# Patient Record
(demographics unavailable — no encounter records)

---

## 2025-02-03 NOTE — HISTORY OF PRESENT ILLNESS
[de-identified] : Patient is a 69F who presents to discuss the role of an allogeneic HSCT in the management of their myelofibrosis. I had the pleasure of seeing the patient in consultation, who was referred by Dr. Jiménez at [facility] [place], with whom she follows for myelofibrosis. She also follows with Dr. Cleveland at [facility] [place] for mgmt of her diagnosis of marginal zone lymphoma. Patient is accompanied by her son Blane.  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 She was followed for dx of ET since  (see supportive care below), until in  when BMBx performed consistent with myelofibrosis (fibrosis grade 2-3) and a lymphoma. At that time she started procrit injections which continued q2-3 weeks until present. She had progressive decrease in hgb warranting a repeat BMBx in 2024 and based on these findings initiated 10/2024 for treatment of MZL - weekly x4 doses then planned for monthly for total 6 doses. Also performed imaging at that time, with 2024 CT A/P revealing 19.1cm splenomegaly, similar to seen on prior PET/CT in . Discussed initiation of momelitinib at that time, held until completion of rituximab. BMBx was repeated again 2024, imaging has not yet been repeated. See tx/staging hx below, incomplete records are available. Pathology and labwork notable for: CALR, del 20q, no peripheral blasts detected.  More recently developed new transfusion requirement, unclear whether related to lymphoma vs MF- she was hospitalized at [facility] early 2025 and found to have hgb 4.1, transfused 2u PRBC and sent home. Again on 25 required a PRBC transfusion outpatient for hgb 6.4, no prior transfusion requirements. At this time planned to start momelitinib 25.  She was seen by lymphoma BMT specialists at [facility] [place] (gabriel), and presents to [facility] for BMT consult with Dr. oSn.  --------------------Treatment Hx: ------------------------- for ET-MF:dx  inegrilide until  hydrea   MF: dx  Procrit q2-3 weeks 8/15/22 - present momelotinib, to start 25  for lymphoma: rituxan weekly x4, then monthly First dose: 10/2024 - ongoing  --------------------Imaging Hx:------------------------------ 24 CT A/P: since 22 probably unchanged hepatosplenomegaly, unchanged mottled appearance and patchy sclerosis throughout the visualized osseos structures probably r/t MF. LLL 0.6cm nodule. unclear if present in prior exam. F/u suggested.    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  24 bmbX - persistent MPN with no increase in blasts (MF 2-3) - persistent low grade B cell lymphoma, 40-50% blasts by IHC ---Morphology: ---Flow: 3% abnormal B cell poulation ---FISH: ---Karyotype: ---NGS: TET2, CALR  24 BMBx - low grade b-cell lymphoma with plasmacytic differentiation, 5-10% by CD20 IHC - myeloproliferative neoplasm, fibrotic stage (MF 2-3), <5% blast by CD34 IHC - Hypercellular, 80-90% - FISH: del 20q, normal female karyotype   --------------------Social Hx: --------------------------- The patient is marital status and has 2 children (son age 41, daughter age 39 who lives in [facility]). Work:  --------------------Surgical Hx: ------------------------  section  --------------------Past Family Hx:----------------------    ------------------Medications----------------------------- acyclovir 400mg BID vitamin b12 500mcg daily ASA 81mg daily topamax 50mg Q AM, 100mg Q PM ocuvite oral daily ativan 0.5mg PRN probiotic capsule daily biotin 5000mcg gabapentin 300mg nightly spironolactone daily Tylenol/Ibuprofen PRN  [de-identified] : Presents for consult visit, for consideration of allo SCT in treatment of her myelofibrosis. She reports feeling generallly well, some increased fatigue since recently requiring PRBC transfusions, now s/p 4 total in past ~month. Otherwise remains active and independent, she is working as an . Tolerated rituxan for tx of MZL without issue, planned for last dose next week (q 4 wk), and will start momelitonib tomorrow when delivered. Otherwise reports pruritis nightly keeping her awake, since novermber. Denies night sweats at this time (over summer 2024 had 3x/wk), fever/chills, unexpected wt or appetite change.  LLE swelling since injury in car door very slow improvement, no orthopedic or vascular imaging per reports. Pain controlled with advil/ibuprofen PRN and more sparingly gabapentin 300mg nightly, and does not limit ability to perform ADLs.

## 2025-02-03 NOTE — PHYSICAL EXAM
[Thin] : thin [Normal] : affect appropriate [Restricted in physically strenuous activity but ambulatory and able to carry out work of a light or sedentary nature] : Status 1- Restricted in physically strenuous activity but ambulatory and able to carry out work of a light or sedentary nature, e.g., light house work, office work [de-identified] : palpable spleen 3-4 fingers below

## 2025-02-03 NOTE — HISTORY OF PRESENT ILLNESS
[de-identified] : Patient is a 69F who presents to discuss the role of an allogeneic HSCT in the management of their myelofibrosis. I had the pleasure of seeing the patient in consultation, who [place] [facility] [place] for [place]ofib[place]. She [place] [facility] [place] diagnosis of marginal zone lymphoma. Patient is accompanied by her son Blane.  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 She was followed for dx of ET since  (see supportive care below), until in  when BMBx performed consistent with myelofibrosis (fibrosis grade 2-3) and a lymphoma. At that time she started procrit injections which continued q2-3 weeks until present. She had progressive decrease in hgb warranting a repeat BMBx in 2024 and based on these findings initiated 10/2024 for treatment of MZL - weekly x4 doses then planned for monthly for total 6 doses. Also performed imaging at that time, with 2024 CT A/P revealing 19.1cm splenomegaly, similar to seen on prior PET/CT in . Discussed initiation of momelitinib at that time, held until completion of rituximab. BMBx was repeated again 2024, imaging has not yet been repeated. See tx/staging hx below, incomplete records are available. Pathology and labwork notable for: CALR, del 20q, no peripheral blasts detected.  More recently developed new transfusion requirement, unclear whether related to lymphoma vs MF- she was hospitalized at [facility] early 2025 and found to have hgb 4.1, transfused 2u PRBC and sent home. Again on 25 required a PRBC transfusion outpatient for hgb 6.4, no prior transfusion requirements. At this time planned to start momelitinib 25.  [place] B[place] [facility] [place] and presents to [facility] for BMT consult with Dr. Son.  --------------------Treatment Hx: ------------------------- for ET-MF:dx  inegrilide until  hydrea   MF: dx  Procrit q2-3 weeks 8/15/22 - present momelotinib, to start 25  for lymphoma: rituxan weekly x4, then monthly First dose: 10/2024 - ongoing  --------------------Imaging Hx:------------------------------ 24 CT A/P: since 22 probably unchanged hepatosplenomegaly, unchanged mottled appearance and patchy sclerosis throughout the visualized osseos structures probably r/t MF. LLL 0.6cm nodule. unclear if present in prior exam. F/u suggested.    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  24 bmbX - persistent MPN with no increase in blasts (MF 2-3) - persistent low grade B cell lymphoma, 40-50% blasts by IHC ---Morphology: ---Flow: 3% abnormal B cell poulation ---FISH: ---Karyotype: ---NGS: TET2, CALR  24 BMBx - low grade b-cell lymphoma with plasmacytic differentiation, 5-10% by CD20 IHC - myeloproliferative neoplasm, fibrotic stage (MF 2-3), <5% blast by CD34 IHC - Hypercellular, 80-90% - FISH: del 20q, normal female karyotype   --------------------Social Hx: --------------------------- The patient is marital status and has 2 children (son age 41, daughter age 39 who lives in [facility]). Work:  --------------------Surgical Hx: ------------------------  section  --------------------Past Family Hx:----------------------    ------------------Medications----------------------------- acyclovir 400mg BID vitamin b12 500mcg daily ASA 81mg daily topamax 50mg Q AM, 100mg Q PM ocuvite oral daily ativan 0.5mg PRN probiotic capsule daily biotin 5000mcg gabapentin 300mg nightly spironolactone daily Tylenol/Ibuprofen PRN  [de-identified] : Presents for consult visit, for consideration of allo SCT in treatment of her myelofibrosis. She reports feeling generallly well, some increased fatigue since recently requiring PRBC transfusions, now s/p 4 total in past ~month. Otherwise remains active and independent, she is working as an . Tolerated rituxan for tx of MZL without issue, planned for last dose next week (q 4 wk), and will start momelitonib tomorrow when delivered. Otherwise reports pruritis nightly keeping her awake, since novermber. Denies night sweats at this time (over summer 2024 had 3x/wk), fever/chills, unexpected wt or appetite change.  LLE swelling since injury in car door very slow improvement, no orthopedic or vascular imaging per reports. Pain controlled with advil/ibuprofen PRN and more sparingly gabapentin 300mg nightly, and does not limit ability to perform ADLs.

## 2025-02-03 NOTE — PHYSICAL EXAM
[Thin] : thin [Normal] : affect appropriate [Restricted in physically strenuous activity but ambulatory and able to carry out work of a light or sedentary nature] : Status 1- Restricted in physically strenuous activity but ambulatory and able to carry out work of a light or sedentary nature, e.g., light house work, office work [de-identified] : palpable spleen 3-4 fingers below

## 2025-02-03 NOTE — ASSESSMENT
Per Kesha Martinez NP: Decrease pletal to 50 mg bid - please call back in 1 week with update             Left message for patient to call back [FreeTextEntry1] : Patient is a 69F diagnosed with myelofibrosis, with preceding ET....disease may be accelerating given recent increase in transfusion needs Patient's history, pathology, imaging, and plan was reviewed. She is notable for additional diagnosis of MZL, currently undergoing treatment with rituximab.  Disease: myelofibrosis; MIPPSS intermediate risk Prognostic indicators: CALR, del 20q, no peripheral blasts, +splenomegaly Treatment: not yet started  Patient has not yet started treatment of myelofibrosis, planned for control with momelotinib. At this point, unable to assess disease, as she does have new PRBC transfusion requirement however unclear whether this is r/t progression of myelofibrosis vs r/t co-morbidity of low grade lymphoma which is currently being treated with rituximab (to complete dose 6/6 next week). Pending restaging, can determine BMT Indications for Transplant indication for an allogeneic stem cell transplant based on the ASTCT practice guidelines.  The patient is a good candidate for transplant and has good social support. We spoke to them at length regarding their disease, treatment options, and the role of allogeneic HSCT in the treatment of their myelofibrosis and the expected morbidity and mortality as well as impact on disease-free, overall survival.  We discussed pre-transplant work up, stem cell donor identification, donor collection of stem cells, conditioning chemotherapy, GvHD prophylaxis, allogeneic stem cell transplant hospital admission, expected hospital course, side effects of treatment, discharge medication, risk of relapse and death post-transplant, post-transplant outpatient follow ups and precautions. We also discussed management/further evaluation of lymphoma.   The patient and her son asked a number of questions, their questions have been answered. At this time, would like to proceed with transplant but will take some time to consider and discuss with family the appropriate timeline for this and/or preferred facility with which to pursue transplant as she has been treated else where. She will also follow up with primary hematologist prior to next steps.    We encouraged the patient to take the time to read our transplant book, provided today. They will be referred to our  for a psycho/social evaluation prior to transplant should she proceed. HLA typing for the patient was not performed during the initial consult.   We recommend a repeat bone marrow biopsy to evaluate their disease status (of both MF and MZL), as well as repeat imaging to evaluate splenomegaly, possibly after several weeks on momelotinib. The patient verbalized understanding of the plan. We will reach out to their primary hematologist with our recommendations. The patient was encouraged to contact us with any questions or concerns that may arise in the interim.  Follow-up: pending restaging   Pt seen and examined with Zelda Coles NP [facility] Transplantation [facility] Cellular Therapy [facility]

## 2025-02-03 NOTE — HISTORY OF PRESENT ILLNESS
[de-identified] : Patient is a 69F who presents to discuss the role of an allogeneic HSCT in the management of their myelofibrosis. I had the pleasure of seeing the patient in consultation, who was [place] [facility] [place] for [place]ofib[place]. She also [place] with [place]. Cleveland at [facility] [place]mt of her diagnosis of marginal zone lymphoma. Patient is accompanied by her son Blane.  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 She was followed for dx of ET since  (see supportive care below), until in  when BMBx performed consistent with myelofibrosis (fibrosis grade 2-3) and a lymphoma. At that time she started procrit injections which continued q2-3 weeks until present. She had progressive decrease in hgb warranting a repeat BMBx in 2024 and based on these findings initiated 10/2024 for treatment of MZL - weekly x4 doses then planned for monthly for total 6 doses. Also performed imaging at that time, with 2024 CT A/P revealing 19.1cm splenomegaly, similar to seen on prior PET/CT in . Discussed initiation of momelitinib at that time, held until completion of rituximab. BMBx was repeated again 2024, imaging has not yet been repeated. See tx/staging hx below, incomplete records are available. Pathology and labwork notable for: CALR, del 20q, no peripheral blasts detected.  More recently developed new transfusion requirement, unclear whether related to lymphoma vs MF- she was hospitalized at [facility] early 2025 and found to have hgb 4.1, transfused 2u PRBC and sent home. Again on 25 required a PRBC transfusion outpatient for hgb 6.4, no prior transfusion requirements. At this time planned to start momelitinib 25.  She was seen by lymphoma BMT specialists [place] [facility] [place] and presents to [facility] for BMT consult with Dr. Son.  --------------------Treatment Hx: ------------------------- for ET-MF:dx  inegrilide until  hydrea   MF: dx  Procrit q2-3 weeks 8/15/22 - present momelotinib, to start 25  for lymphoma: rituxan weekly x4, then monthly First dose: 10/2024 - ongoing  --------------------Imaging Hx:------------------------------ 24 CT A/P: since 22 probably unchanged hepatosplenomegaly, unchanged mottled appearance and patchy sclerosis throughout the visualized osseos structures probably r/t MF. LLL 0.6cm nodule. unclear if present in prior exam. F/u suggested.    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  24 bmbX - persistent MPN with no increase in blasts (MF 2-3) - persistent low grade B cell lymphoma, 40-50% blasts by IHC ---Morphology: ---Flow: 3% abnormal B cell poulation ---FISH: ---Karyotype: ---NGS: TET2, CALR  24 BMBx - low grade b-cell lymphoma with plasmacytic differentiation, 5-10% by CD20 IHC - myeloproliferative neoplasm, fibrotic stage (MF 2-3), <5% blast by CD34 IHC - Hypercellular, 80-90% - FISH: del 20q, normal female karyotype   --------------------Social Hx: --------------------------- The patient is marital status and has 2 children (son age 41, daughter age 39 who lives in [facility]). Work:  --------------------Surgical Hx: ------------------------  section  --------------------Past Family Hx:----------------------    ------------------Medications----------------------------- acyclovir 400mg BID vitamin b12 500mcg daily ASA 81mg daily topamax 50mg Q AM, 100mg Q PM ocuvite oral daily ativan 0.5mg PRN probiotic capsule daily biotin 5000mcg gabapentin 300mg nightly spironolactone daily Tylenol/Ibuprofen PRN  [de-identified] : Presents for consult visit, for consideration of allo SCT in treatment of her myelofibrosis. She reports feeling generallly well, some increased fatigue since recently requiring PRBC transfusions, now s/p 4 total in past ~month. Otherwise remains active and independent, she is working as an . Tolerated rituxan for tx of MZL without issue, planned for last dose next week (q 4 wk), and will start momelitonib tomorrow when delivered. Otherwise reports pruritis nightly keeping her awake, since novermber. Denies night sweats at this time (over summer 2024 had 3x/wk), fever/chills, unexpected wt or appetite change.  LLE swelling since injury in car door very slow improvement, no orthopedic or vascular imaging per reports. Pain controlled with advil/ibuprofen PRN and more sparingly gabapentin 300mg nightly, and does not limit ability to perform ADLs.

## 2025-02-03 NOTE — REASON FOR VISIT
[Initial Consultation] : an initial consultation for [Myeloproliferative Disorder] : myeloproliferative disorder [Family Member] : family member [FreeTextEntry2] : allo SCT evaluation

## 2025-02-03 NOTE — REVIEW OF SYSTEMS
[Diarrhea: Grade 0] : Diarrhea: Grade 0 [Negative] : Allergic/Immunologic [Fatigue] : no fatigue [FreeTextEntry2] : pruritis

## 2025-02-03 NOTE — ASSESSMENT
[FreeTextEntry1] : Patient is a 69F diagnosed with myelofibrosis, with preceding ET....disease may be accelerating given recent increase in transfusion needs Patient's history, pathology, imaging, and plan was reviewed. She is notable for additional diagnosis of MZL, currently undergoing treatment with rituximab.  Disease: myelofibrosis; MIPPSS intermediate risk Prognostic indicators: CALR, del 20q, no peripheral blasts, +splenomegaly Treatment: not yet started  Patient has not yet started treatment of myelofibrosis, planned for control with momelotinib. At this point, unable to assess disease, as she does have new PRBC transfusion requirement however unclear whether this is r/t progression of myelofibrosis vs r/t co-morbidity of low grade lymphoma which is currently being treated with rituximab (to complete dose 6/6 next week). Pending restaging, can determine BMT Indications for Transplant indication for an allogeneic stem cell transplant based on the ASTCT practice guidelines.  The patient is a good candidate for transplant and has good social support. We spoke to them at length regarding their disease, treatment options, and the role of allogeneic HSCT in the treatment of their myelofibrosis and the expected morbidity and mortality as well as impact on disease-free, overall survival.  We discussed pre-transplant work up, stem cell donor identification, donor collection of stem cells, conditioning chemotherapy, GvHD prophylaxis, allogeneic stem cell transplant hospital admission, expected hospital course, side effects of treatment, discharge medication, risk of relapse and death post-transplant, post-transplant outpatient follow ups and precautions. We also discussed management/further evaluation of lymphoma.   The patient and her son asked a number of questions, their questions have been answered. At this time, would like to proceed with transplant but will take some time to consider and discuss with family the appropriate timeline for this and/or preferred facility with which to pursue transplant as she has been treated else where. She will also follow up with primary hematologist prior to next steps.    We encouraged the patient to take the time to read our transplant book, provided today. They will be referred to our  for a psycho/social evaluation prior to transplant should she proceed. HLA typing for the patient was not performed during the initial consult.   We recommend a repeat bone marrow biopsy to evaluate their disease status (of both MF and MZL), as well as repeat imaging to evaluate splenomegaly, possibly after several weeks on momelotinib. The patient verbalized understanding of the plan. We will reach out to their primary hematologist with our recommendations. The patient was encouraged to contact us with any questions or concerns that may arise in the interim.  Follow-up: pending restaging   Pt seen and examined with Zelda Coles NP [facility] Adult Transplantation and Cellular Therapy Program

## 2025-02-03 NOTE — PHYSICAL EXAM
[Thin] : thin [Normal] : affect appropriate [Restricted in physically strenuous activity but ambulatory and able to carry out work of a light or sedentary nature] : Status 1- Restricted in physically strenuous activity but ambulatory and able to carry out work of a light or sedentary nature, e.g., light house work, office work [de-identified] : palpable spleen 3-4 fingers below

## 2025-02-03 NOTE — ASSESSMENT
[FreeTextEntry1] : Patient is a 69F diagnosed with myelofibrosis, with preceding ET....disease may be accelerating given recent increase in transfusion needs Patient's history, pathology, imaging, and plan was reviewed. She is notable for additional diagnosis of MZL, currently undergoing treatment with rituximab.  Disease: myelofibrosis; MIPPSS intermediate risk Prognostic indicators: CALR, del 20q, no peripheral blasts, +splenomegaly Treatment: not yet started  Patient has not yet started treatment of myelofibrosis, planned for control with momelotinib. At this point, unable to assess disease, as she does have new PRBC transfusion requirement however unclear whether this is r/t progression of myelofibrosis vs r/t co-morbidity of low grade lymphoma which is currently being treated with rituximab (to complete dose 6/6 next week). Pending restaging, can determine BMT Indications for Transplant indication for an allogeneic stem cell transplant based on the [facility] practice guidelines.  The patient is a good candidate for transplant and has good social support. We spoke to them at length regarding their disease, treatment options, and the role of allogeneic HSCT in the treatment of their myelofibrosis and the expected morbidity and mortality as well as impact on disease-free, overall survival.  We discussed pre-transplant work up, stem cell donor identification, donor collection of stem cells, conditioning chemotherapy, GvHD prophylaxis, allogeneic stem cell transplant hospital admission, expected hospital course, side effects of treatment, discharge medication, risk of relapse and death post-transplant, post-transplant outpatient follow ups and precautions. We also discussed management/further evaluation of lymphoma.   The patient and her son asked a number of questions, their questions have been answered. At this time, would like to proceed with transplant but will take some time to consider and discuss with family the appropriate timeline for this and/or preferred facility with which to pursue transplant as she has been treated else where. She will also follow up with primary hematologist prior to next steps.    We encouraged the patient to take the time to read our transplant book, provided today. They will be referred to our  for a psycho/social evaluation prior to transplant should she proceed. HLA typing for the patient was not performed during the initial consult.   We recommend a repeat bone marrow biopsy to evaluate their disease status (of both MF and MZL), as well as repeat imaging to evaluate splenomegaly, possibly after several weeks on momelotinib. The patient verbalized understanding of the plan. We will reach out to their primary hematologist with our recommendations. The patient was encouraged to contact us with any questions or concerns that may arise in the interim.  Follow-up: pending restaging   Pt seen and examined with Zelda Coles NP [facility]